# Patient Record
Sex: MALE | Race: WHITE | NOT HISPANIC OR LATINO | Employment: UNEMPLOYED | URBAN - NONMETROPOLITAN AREA
[De-identification: names, ages, dates, MRNs, and addresses within clinical notes are randomized per-mention and may not be internally consistent; named-entity substitution may affect disease eponyms.]

---
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
## 2022-04-17 ENCOUNTER — HOSPITAL ENCOUNTER (EMERGENCY)
Facility: OTHER | Age: 22
Discharge: HOME | End: 2022-04-17
Attending: STUDENT IN AN ORGANIZED HEALTH CARE EDUCATION/TRAINING PROGRAM | Admitting: STUDENT IN AN ORGANIZED HEALTH CARE EDUCATION/TRAINING PROGRAM
Payer: COMMERCIAL

## 2022-04-17 VITALS
RESPIRATION RATE: 20 BRPM | DIASTOLIC BLOOD PRESSURE: 68 MMHG | WEIGHT: 220 LBS | TEMPERATURE: 98.5 F | SYSTOLIC BLOOD PRESSURE: 137 MMHG | HEIGHT: 69 IN | HEART RATE: 80 BPM | BODY MASS INDEX: 32.58 KG/M2 | OXYGEN SATURATION: 100 %

## 2022-04-17 DIAGNOSIS — K21.00 GASTROESOPHAGEAL REFLUX DISEASE WITH ESOPHAGITIS WITHOUT HEMORRHAGE: ICD-10-CM

## 2022-04-17 DIAGNOSIS — R10.12 ABDOMINAL PAIN, LEFT UPPER QUADRANT: ICD-10-CM

## 2022-04-17 PROCEDURE — 250N000013 HC RX MED GY IP 250 OP 250 PS 637: Performed by: STUDENT IN AN ORGANIZED HEALTH CARE EDUCATION/TRAINING PROGRAM

## 2022-04-17 PROCEDURE — 99283 EMERGENCY DEPT VISIT LOW MDM: CPT | Performed by: STUDENT IN AN ORGANIZED HEALTH CARE EDUCATION/TRAINING PROGRAM

## 2022-04-17 PROCEDURE — 250N000009 HC RX 250: Performed by: STUDENT IN AN ORGANIZED HEALTH CARE EDUCATION/TRAINING PROGRAM

## 2022-04-17 RX ORDER — LIDOCAINE HYDROCHLORIDE 20 MG/ML
15 SOLUTION OROPHARYNGEAL ONCE
Status: COMPLETED | OUTPATIENT
Start: 2022-04-17 | End: 2022-04-17

## 2022-04-17 RX ORDER — MAGNESIUM HYDROXIDE/ALUMINUM HYDROXICE/SIMETHICONE 120; 1200; 1200 MG/30ML; MG/30ML; MG/30ML
15 SUSPENSION ORAL ONCE
Status: COMPLETED | OUTPATIENT
Start: 2022-04-17 | End: 2022-04-17

## 2022-04-17 RX ORDER — ALUMINA, MAGNESIA, AND SIMETHICONE 2400; 2400; 240 MG/30ML; MG/30ML; MG/30ML
30 SUSPENSION ORAL EVERY 4 HOURS PRN
Qty: 355 ML | Refills: 0 | Status: SHIPPED | OUTPATIENT
Start: 2022-04-17

## 2022-04-17 RX ADMIN — MAGNESIUM HYDROXIDE/ALUMINUM HYDROXICE/SIMETHICONE 15 ML: 120; 1200; 1200 SUSPENSION ORAL at 20:50

## 2022-04-17 RX ADMIN — LIDOCAINE HYDROCHLORIDE 15 ML: 20 SOLUTION ORAL; TOPICAL at 20:50

## 2022-04-18 NOTE — ED TRIAGE NOTES
"ED Nursing Triage Note (General)   ________________________________    Loi Oneill is a 22 year old Male that presents to triage private car  With history of constant abdominal pain and fullness, worse after meals, reported by patient   Significant symptoms had onset 3 days ago.  BP (!) 141/65   Pulse 94   Temp 98  F (36.7  C)   Resp 22   Ht 1.753 m (5' 9\")   Wt 99.8 kg (220 lb)   SpO2 100%   BMI 32.49 kg/m  t  Patient appears alert  and oriented, in no acute distress., and cooperative behavior.    GCS Total = 15  Airway: intact  Breathing noted as Normal  Circulation Normal  Skin:  Normal  Action taken:  Triage to critical care immediately      PRE HOSPITAL PRIOR LIVING SITUATION Spouse    "

## 2022-04-18 NOTE — ED PROVIDER NOTES
History     Chief Complaint   Patient presents with     Abdominal Pain     HPI  Loi Oneill is a 22 year old man otherwise healthy who presents for evaluation of abdominal pain.  He reports pain in the middle of his upper abdomen as well as left upper abdomen that started about 3 days ago, gradual in onset.  Pain is predictably worse after eating and with lying flat at night.  Pain does seem to go away between meals but always comes back after eating.  Notably patient has never had pain in the right upper part of his abdomen or the back.  No associated fevers or chills, nausea or vomiting, or any other complaints.  He does have a history of appendicitis status post appendectomy in the past, no other abdominal surgeries.  Notably no pain in the chest and no difficulty breathing.    Allergies:  No Known Allergies    Problem List:    Patient Active Problem List    Diagnosis Date Noted     Phimosis 03/18/2019     Priority: Medium     Polyuria 11/18/2011     Priority: Medium     Enuresis 11/18/2011     Priority: Medium        Past Medical History:    Past Medical History:   Diagnosis Date     Acute appendicitis with localized peritonitis      Otitis media        Past Surgical History:    Past Surgical History:   Procedure Laterality Date     OTHER SURGICAL HISTORY      4/14/2017,00819.0,[place] LAP APPENDECTOMY       Family History:    Family History   Problem Relation Age of Onset     Genetic Disorder Other         Genetic,None noted       Social History:  Marital Status:  Single [1]  Social History     Tobacco Use     Smoking status: Never Smoker     Smokeless tobacco: Never Used   Substance Use Topics     Alcohol use: Not Currently     Drug use: No     Comment: Drug use: No        Medications:    alum & mag hydroxide-simethicone (MAALOX ADVANCED MAX ST) 400-400-40 MG/5ML SUSP suspension  [START ON 4/18/2022] omeprazole (PRILOSEC) 20 MG DR capsule  ibuprofen (ADVIL/MOTRIN) 200 MG tablet          Review of  "Systems  Please see HPI above for pertinent positives and negatives.  All other systems reviewed and found to be negative.    Physical Exam   BP: (!) 141/65  Pulse: 94  Temp: 98  F (36.7  C)  Resp: 22  Height: 175.3 cm (5' 9\")  Weight: 99.8 kg (220 lb)  SpO2: 100 %      Physical Exam  Gen: Lying in bed, alert, nontoxic, no acute distress  HEENT: Normocephalic and atraumatic, mucous members moist, conjunctiva clear  CV: Regular rate and rhythm, appears warm and well-perfused  Pulm: Clear bilaterally, normal respiratory effort  Abd: Soft, tender to palpation in the epigastrium and left upper quadrant, no guarding or rebound, nondistended  Skin: No rash or other lesions  MSK: No gross deformities or swelling  Neuro: Alert and oriented x4, no focal deficits    ED Course              ED Course as of 04/17/22 2343   Sun Apr 17, 2022 2040 Patient evaluated, here with LUQ abdominal pain worse with eating and lying flat starting 3 days ago. History highly c/w GERD, plan for GI cocktail and discharge with close outpatient follow-up. No PE risk factors and PERC negative, no pain radiating to chest or SOB to suggest ACS equivalent, no RUQ tenderness on exam and no nausea/vomiting, very low suspicion for cholelithiasis contributing.   2136 Patient feeling better on recheck, plan for discharge with close outpatient follow-up, strict return precautions provided     Procedures              Critical Care time:  none               No results found for this or any previous visit (from the past 24 hour(s)).    Medications   alum & mag hydroxide-simethicone (MAALOX) suspension 15 mL (15 mLs Oral Given 4/17/22 2050)     And   lidocaine (viscous) (XYLOCAINE) 2 % solution 15 mL (15 mLs Mouth/Throat Given 4/17/22 2050)       Assessments & Plan (with Medical Decision Making)   22 year old man otherwise healthy who presents for evaluation of left upper and epigastric abdominal pain starting gradually 3 days ago, worse after eating and with " lying flat at night.  Vitally stable here, afebrile, saturating well on room air.  Minimal tenderness to the epigastrium and left upper quadrant on exam, there was no right upper quadrant tenderness or lower abdominal tenderness, benign abdomen with no rebound or guarding.  No report of chest pain or difficulty breathing to raise suspicion for aortic dissection or ACS patient's age and history of symptoms is highly atypical for this.  History is highly consistent with GERD, less likely peptic ulcer disease, no report of blood in stool or nausea/vomiting strongly suggest this.  Symptoms did improve after GI cocktail further supporting GERD as likely etiology.  Low suspicion for biliary pathology given complete absence of tenderness in the right upper quadrant or pain localizing to this area but cannot definitively exclude at this time.  We will plan for omeprazole daily as well as MiraLAX as needed and close outpatient follow-up with PCP later this week, strict return precautions provided.    From ED discharge instructions:  You likely have reflux (heartburn) causing your symptoms. You received a medication containing viscous lidocaine and maalox here in the ER with some improvement in your symptoms. See attached instructions for more information on what this means and how to manage it going forward.    Start taking omeprazole 20 mg once daily starting tomorrow morning.  You can also take Maalox every 4 hours as needed for ongoing abdominal discomfort not controlled with omeprazole.  You can also take Tylenol 1000 mg (2 extra strength tablets) up to 3 times daily as needed for pain.  Avoid ibuprofen or other NSAIDs as this can irritate the stomach.    Make an appointment to follow-up with your primary provider in clinic in about 1 week for recheck and to determine next steps.    Come back to the emergency department immediately or call 911 if severe worsening pain not controlled with medication, vomiting and  inability to eat or drink, new fever greater than 101  F, new chest pain or difficulty breathing, or any other acute concerns.  I have reviewed the nursing notes.    I have reviewed the findings, diagnosis, plan and need for follow up with the patient.       Discharge Medication List as of 4/17/2022  9:43 PM      START taking these medications    Details   alum & mag hydroxide-simethicone (MAALOX ADVANCED MAX ST) 400-400-40 MG/5ML SUSP suspension Take 30 mLs by mouth every 4 hours as needed for indigestion or heartburn, Disp-355 mL, R-0, E-Prescribe      omeprazole (PRILOSEC) 20 MG DR capsule Take 1 capsule (20 mg) by mouth daily, Disp-30 capsule, R-0, E-Prescribe             Final diagnoses:   Abdominal pain, left upper quadrant   Gastroesophageal reflux disease with esophagitis without hemorrhage       4/17/2022   [facility] CenterGarth paul MD  04/18/22 9990

## 2022-04-18 NOTE — DISCHARGE INSTRUCTIONS
You likely have reflux (heartburn) causing your symptoms. You received a medication containing viscous lidocaine and maalox here in the ER with some improvement in your symptoms. See attached instructions for more information on what this means and how to manage it going forward.    Start taking omeprazole 20 mg once daily starting tomorrow morning.  You can also take Maalox every 4 hours as needed for ongoing abdominal discomfort not controlled with omeprazole.  You can also take Tylenol 1000 mg (2 extra strength tablets) up to 3 times daily as needed for pain.  Avoid ibuprofen or other NSAIDs as this can irritate the stomach.    Make an appointment to follow-up with your primary provider in clinic in about 1 week for recheck and to determine next steps.    Come back to the emergency department immediately or call 911 if severe worsening pain not controlled with medication, vomiting and inability to eat or drink, new fever greater than 101  F, new chest pain or difficulty breathing, or any other acute concerns.

## 2023-03-01 ENCOUNTER — HOSPITAL ENCOUNTER (OUTPATIENT)
Dept: GENERAL RADIOLOGY | Facility: OTHER | Age: 23
Discharge: HOME | End: 2023-03-01
Attending: NURSE PRACTITIONER
Payer: COMMERCIAL

## 2023-03-01 ENCOUNTER — OFFICE VISIT (OUTPATIENT)
Dept: FAMILY MEDICINE | Facility: OTHER | Age: 23
End: 2023-03-01
Attending: NURSE PRACTITIONER
Payer: COMMERCIAL

## 2023-03-01 VITALS
RESPIRATION RATE: 21 BRPM | WEIGHT: 250.5 LBS | TEMPERATURE: 97.7 F | OXYGEN SATURATION: 97 % | SYSTOLIC BLOOD PRESSURE: 110 MMHG | BODY MASS INDEX: 36.99 KG/M2 | DIASTOLIC BLOOD PRESSURE: 72 MMHG | HEART RATE: 83 BPM

## 2023-03-01 DIAGNOSIS — R06.02 SHORTNESS OF BREATH: ICD-10-CM

## 2023-03-01 DIAGNOSIS — R05.1 ACUTE COUGH: Primary | ICD-10-CM

## 2023-03-01 DIAGNOSIS — J32.1 FRONTAL SINUSITIS, UNSPECIFIED CHRONICITY: ICD-10-CM

## 2023-03-01 DIAGNOSIS — R05.1 ACUTE COUGH: ICD-10-CM

## 2023-03-01 LAB
GROUP A STREP BY PCR: NOT DETECTED
SARS-COV-2 RNA RESP QL NAA+PROBE: NEGATIVE

## 2023-03-01 PROCEDURE — 71046 X-RAY EXAM CHEST 2 VIEWS: CPT

## 2023-03-01 PROCEDURE — U0005 INFEC AGEN DETEC AMPLI PROBE: HCPCS | Mod: ZL | Performed by: NURSE PRACTITIONER

## 2023-03-01 PROCEDURE — G0463 HOSPITAL OUTPT CLINIC VISIT: HCPCS

## 2023-03-01 PROCEDURE — C9803 HOPD COVID-19 SPEC COLLECT: HCPCS | Performed by: NURSE PRACTITIONER

## 2023-03-01 PROCEDURE — 99214 OFFICE O/P EST MOD 30 MIN: CPT | Mod: CS | Performed by: NURSE PRACTITIONER

## 2023-03-01 PROCEDURE — G0463 HOSPITAL OUTPT CLINIC VISIT: HCPCS | Mod: 25

## 2023-03-01 PROCEDURE — 87651 STREP A DNA AMP PROBE: CPT | Mod: ZL | Performed by: NURSE PRACTITIONER

## 2023-03-01 RX ORDER — ALBUTEROL SULFATE 90 UG/1
2 AEROSOL, METERED RESPIRATORY (INHALATION) 4 TIMES DAILY
Qty: 18 G | Refills: 0 | Status: SHIPPED | OUTPATIENT
Start: 2023-03-01

## 2023-03-01 ASSESSMENT — ENCOUNTER SYMPTOMS
TROUBLE SWALLOWING: 0
COUGH: 1
SHORTNESS OF BREATH: 1
MUSCULOSKELETAL NEGATIVE: 1
FEVER: 0
SINUS PRESSURE: 1
ACTIVITY CHANGE: 0
SINUS PAIN: 1
FATIGUE: 1
WHEEZING: 1
HEADACHES: 0
NEUROLOGICAL NEGATIVE: 1
GASTROINTESTINAL NEGATIVE: 1
CARDIOVASCULAR NEGATIVE: 1
SORE THROAT: 1
CHILLS: 0

## 2023-03-01 ASSESSMENT — PAIN SCALES - GENERAL: PAINLEVEL: NO PAIN (0)

## 2023-03-01 NOTE — NURSING NOTE
"Chief Complaint   Patient presents with     Nasal Congestion     Cough, nausea, SOB     Patient presents to clinic with congestion, sob, cough and nausea that has been ongoing for about a month. He states it is getting harder to catch his breath doing daily activities.    Initial /72 (BP Location: Left arm, Patient Position: Sitting, Cuff Size: Adult Large)   Pulse 83   Temp 97.7  F (36.5  C) (Tympanic)   Resp 21   Wt 113.6 kg (250 lb 8 oz)   SpO2 97%   BMI 36.99 kg/m   Estimated body mass index is 36.99 kg/m  as calculated from the following:    Height as of 4/17/22: 1.753 m (5' 9\").    Weight as of this encounter: 113.6 kg (250 lb 8 oz).  Medication Reconciliation: complete        Cristel Kennedy  "

## 2023-03-06 NOTE — PROGRESS NOTES
Assessment & Plan     1. Acute cough  2. Shortness of breath  Improving. Continue with Augmentin until entire course is complete. Consider trying daily multivitamin including vitamin C and Zinc to help boost immune system due to frequent illnesses. Follow up as needed.       Return if symptoms worsen or fail to improve.    Niecy Oneill PA-C  [facility]    Ramya Monsivais is a 23 year old, presenting for the following health issues:  Follow Up      History of Present Illness       Reason for visit:  Check up for sickness    He eats 0-1 servings of fruits and vegetables daily.He consumes 1 sweetened beverage(s) daily.He exercises with enough effort to increase his heart rate 10 to 19 minutes per day.  He exercises with enough effort to increase his heart rate 5 days per week.   He is taking medications regularly.     Here for [facility] follow up. Seen in the rapid clinic on 3/1/2023 where he was diagnosed with sinusitis and was treated with Augmentin and albuterol inhaler.  Chest x-ray completed at that time was normal.  Has continued on the Augmentin and has been noticing improvement.  He is still struggle with a cough that is waking him from sleep early in the morning.  He is productive.  No associated fevers, improving shortness of breath, no GI symptoms, rash.  Patient is wondering if there is anything that he can take to help boost his immune system is reports he does struggle with several illnesses per year.    PAST MEDICAL HISTORY:   Past Medical History:   Diagnosis Date     Acute appendicitis with localized peritonitis     4/14/2017     Otitis media     10/24/03,Left acture       PAST SURGICAL HISTORY:   Past Surgical History:   Procedure Laterality Date     OTHER SURGICAL HISTORY      4/14/2017,20129.0,ID LAP APPENDECTOMY       FAMILY HISTORY:   Family History   Problem Relation Age of Onset     Genetic Disorder Other         Genetic,None noted       SOCIAL HISTORY:  "  Social History     Tobacco Use     Smoking status: Former     Types: Cigarettes     Smokeless tobacco: Current     Types: Snuff   Substance Use Topics     Alcohol use: Yes      No Known Allergies  Current Outpatient Medications   Medication     albuterol (PROAIR HFA/PROVENTIL HFA/VENTOLIN HFA) 108 (90 Base) MCG/ACT inhaler     alum & mag hydroxide-simethicone (MAALOX ADVANCED MAX ST) 400-400-40 MG/5ML SUSP suspension     amoxicillin-clavulanate (AUGMENTIN) 875-125 MG tablet     ibuprofen (ADVIL/MOTRIN) 200 MG tablet     No current facility-administered medications for this visit.         Review of Systems   Per HPI        Objective    /78   Pulse 91   Temp (!) 95.1  F (35.1  C)   Resp 14   Ht 1.753 m (5' 9\")   Wt 114 kg (251 lb 6.4 oz)   SpO2 96%   BMI 37.13 kg/m    Body mass index is 37.13 kg/m .  Physical Exam   General: Pleasant, in no apparent distress.  Cardiovascular: Regular rate and rhythm with S1 equal to S2. No murmurs, friction rubs, or gallops.   Respiratory: Lungs are resonant and clear to auscultation bilaterally. No wheezes, crackles, or rhonchi.  Psych: Appropriate mood and affect.      "

## 2023-03-07 ENCOUNTER — OFFICE VISIT (OUTPATIENT)
Dept: FAMILY MEDICINE | Facility: OTHER | Age: 23
End: 2023-03-07
Attending: PHYSICIAN ASSISTANT
Payer: COMMERCIAL

## 2023-03-07 VITALS
RESPIRATION RATE: 14 BRPM | BODY MASS INDEX: 37.23 KG/M2 | TEMPERATURE: 95.1 F | WEIGHT: 251.4 LBS | SYSTOLIC BLOOD PRESSURE: 126 MMHG | HEIGHT: 69 IN | HEART RATE: 91 BPM | OXYGEN SATURATION: 96 % | DIASTOLIC BLOOD PRESSURE: 78 MMHG

## 2023-03-07 DIAGNOSIS — R05.1 ACUTE COUGH: Primary | ICD-10-CM

## 2023-03-07 DIAGNOSIS — R06.02 SHORTNESS OF BREATH: ICD-10-CM

## 2023-03-07 PROCEDURE — G0463 HOSPITAL OUTPT CLINIC VISIT: HCPCS

## 2023-03-07 PROCEDURE — 99213 OFFICE O/P EST LOW 20 MIN: CPT | Performed by: PHYSICIAN ASSISTANT

## 2023-03-07 ASSESSMENT — PAIN SCALES - GENERAL: PAINLEVEL: NO PAIN (0)

## 2023-03-07 NOTE — NURSING NOTE
"Patient presents to clinic for follow up on rapid clinic. He states that he has not finished his antibiotic. He is wondering if there \"is a medication that I can take to help from getting sick\".  Yamileth Sher LPN ....................  3/7/2023   9:12 AM      "

## 2023-05-06 ENCOUNTER — HEALTH MAINTENANCE LETTER (OUTPATIENT)
Age: 23
End: 2023-05-06

## 2024-07-13 ENCOUNTER — HEALTH MAINTENANCE LETTER (OUTPATIENT)
Age: 24
End: 2024-07-13

## 2025-07-19 ENCOUNTER — HEALTH MAINTENANCE LETTER (OUTPATIENT)
Age: 25
End: 2025-07-19

## (undated) RX ORDER — MAGNESIUM HYDROXIDE/ALUMINUM HYDROXICE/SIMETHICONE 120; 1200; 1200 MG/30ML; MG/30ML; MG/30ML
SUSPENSION ORAL
Status: DISPENSED
Start: 2022-04-17

## (undated) RX ORDER — BUPIVACAINE HYDROCHLORIDE 5 MG/ML
INJECTION, SOLUTION PERINEURAL
Status: DISPENSED
Start: 2020-07-05

## (undated) RX ORDER — LIDOCAINE HYDROCHLORIDE 20 MG/ML
SOLUTION OROPHARYNGEAL
Status: DISPENSED
Start: 2022-04-17

## (undated) RX ORDER — GINSENG 100 MG
CAPSULE ORAL
Status: DISPENSED
Start: 2020-07-05